# Patient Record
Sex: MALE | URBAN - METROPOLITAN AREA
[De-identification: names, ages, dates, MRNs, and addresses within clinical notes are randomized per-mention and may not be internally consistent; named-entity substitution may affect disease eponyms.]

---

## 2022-08-03 ENCOUNTER — OFFICE VISIT (OUTPATIENT)
Dept: URBAN - METROPOLITAN AREA CLINIC 60 | Facility: CLINIC | Age: 47
End: 2022-08-03
Payer: COMMERCIAL

## 2022-08-03 DIAGNOSIS — H43.393 OTHER VITREOUS OPACITIES, BILATERAL: ICD-10-CM

## 2022-08-03 DIAGNOSIS — H25.813 COMBINED FORMS OF AGE-RELATED CATARACT, BILATERAL: Primary | ICD-10-CM

## 2022-08-03 PROCEDURE — 92004 COMPRE OPH EXAM NEW PT 1/>: CPT | Performed by: OPTOMETRIST

## 2022-08-03 ASSESSMENT — VISUAL ACUITY
OD: 20/150
OS: 20/20

## 2022-08-03 ASSESSMENT — INTRAOCULAR PRESSURE
OS: 21
OD: 20

## 2022-08-03 NOTE — IMPRESSION/PLAN
Impression: Combined forms of age-related cataract, bilateral: H25.813. Plan: OD>OS. Cataract accounts for patient's complaints. Discussed all risks, benefits, procedures and recovery. Patient desires to have surgery, recommend CE w/IOL. OD first, will need OU due to anisometropia, Discussed toric/Vivity IOL/ LRI in detail with patient. Recommend surgery. Ascan needed. RTC for surgeon consult.

## 2022-08-26 ENCOUNTER — OFFICE VISIT (OUTPATIENT)
Dept: URBAN - METROPOLITAN AREA CLINIC 60 | Facility: CLINIC | Age: 47
End: 2022-08-26
Payer: COMMERCIAL

## 2022-08-26 DIAGNOSIS — H25.813 COMBINED FORMS OF AGE-RELATED CATARACT, BILATERAL: ICD-10-CM

## 2022-08-26 DIAGNOSIS — H43.393 OTHER VITREOUS OPACITIES, BILATERAL: ICD-10-CM

## 2022-08-26 PROCEDURE — 99204 OFFICE O/P NEW MOD 45 MIN: CPT | Performed by: OPHTHALMOLOGY

## 2022-08-26 ASSESSMENT — KERATOMETRY
OD: 43.58
OS: 43.58

## 2022-08-26 ASSESSMENT — INTRAOCULAR PRESSURE
OS: 22
OD: 22

## 2022-08-26 NOTE — IMPRESSION/PLAN
Impression: Combined forms of age-related cataract, bilateral: H25.813. Plan: Cataract accounts for pt complaints. Pt desires sx. Schedule CE/IOL both eyes, OD then OS. Risk/Benefits/Alternatives discussed with patient. Rec. mono-focal/Toric/Vivity. Discussed Panoptix but warned of halos around lights. Discussed possible need reading glasses with Vivity for fine, near work () Consider ORA/LRI. RL2 (ordered fasting glucose and A1C). Target distance. Combination/Generic drops. Order a-scan. Will likely need cataract surgery in the other eye due to anisometropia. Patient is a candidate for Felicia Shed but not Dexycu. Discussed R/B/A. Recommend Ofloxacin or Tobramycin QID for 1 week postop. Intra-cameral Moxifloxacin to decrease the risk of infection. May elect to use combination drops or generics per patient preference.

## 2023-02-09 ENCOUNTER — OFFICE VISIT (OUTPATIENT)
Dept: URBAN - METROPOLITAN AREA CLINIC 60 | Facility: CLINIC | Age: 48
End: 2023-02-09
Payer: COMMERCIAL

## 2023-02-09 DIAGNOSIS — H25.813 COMBINED FORMS OF AGE-RELATED CATARACT, BILATERAL: Primary | ICD-10-CM

## 2023-02-09 PROCEDURE — 92134 CPTRZ OPH DX IMG PST SGM RTA: CPT | Performed by: OPHTHALMOLOGY

## 2023-02-21 ENCOUNTER — PROCEDURE (OUTPATIENT)
Dept: URBAN - METROPOLITAN AREA SURGERY 37 | Facility: SURGERY | Age: 48
End: 2023-02-21
Payer: COMMERCIAL

## 2023-02-21 DIAGNOSIS — H25.813 COMBINED FORMS OF AGE-RELATED CATARACT, BILATERAL: Primary | ICD-10-CM

## 2023-02-21 PROCEDURE — 66984 XCAPSL CTRC RMVL W/O ECP: CPT | Performed by: OPHTHALMOLOGY

## 2023-02-21 PROCEDURE — PR4FY PR4FY: CUSTOM | Performed by: OPHTHALMOLOGY

## 2023-02-22 ENCOUNTER — POST-OPERATIVE VISIT (OUTPATIENT)
Dept: URBAN - METROPOLITAN AREA CLINIC 60 | Facility: CLINIC | Age: 48
End: 2023-02-22
Payer: COMMERCIAL

## 2023-02-22 DIAGNOSIS — Z48.810 ENCOUNTER FOR SURGICAL AFTERCARE FOLLOWING SURGERY ON A SENSE ORGAN: Primary | ICD-10-CM

## 2023-02-22 RX ORDER — BRIMONIDINE TARTRATE 2 MG/ML
0.2 % SOLUTION/ DROPS OPHTHALMIC
Qty: 0 | Refills: 0 | Status: ACTIVE
Start: 2023-02-22

## 2023-02-22 ASSESSMENT — INTRAOCULAR PRESSURE
OD: 52
OD: 30

## 2023-02-22 NOTE — IMPRESSION/PLAN
Impression: S/P Vivity Multifocal OD - 1 Day. Encounter for surgical aftercare following surgery on a sense organ  Z48.810. Plan: Continue with Pred Moxi Nepaf OD. IOP is elevated. Patient burped twice OD. After burp IOP 30 OD. Will start patient on Brimonidine BID OD, sample given. One drop of Brimonidine instilled in OD in clinic.

## 2023-02-28 ENCOUNTER — POST-OPERATIVE VISIT (OUTPATIENT)
Dept: URBAN - METROPOLITAN AREA CLINIC 60 | Facility: CLINIC | Age: 48
End: 2023-02-28
Payer: COMMERCIAL

## 2023-02-28 DIAGNOSIS — Z48.810 ENCOUNTER FOR SURGICAL AFTERCARE FOLLOWING SURGERY ON A SENSE ORGAN: Primary | ICD-10-CM

## 2023-02-28 ASSESSMENT — INTRAOCULAR PRESSURE
OD: 26
OD: 22

## 2023-02-28 ASSESSMENT — VISUAL ACUITY
OS: 20/20
OD: 20/20

## 2023-02-28 NOTE — IMPRESSION/PLAN
Impression: S/P Cataract Extraction by phacoemulsification with IOL placement; ORA OD - 7 Days. Encounter for surgical aftercare following surgery on a sense organ  Z48.810. Plan: Continue with Pred Moxi Nepaf OD. IOP is slightly elevated. Patient may be a possible steroid responder. Patient to also continue with Brimonidine BID OD.

## 2023-03-27 ENCOUNTER — POST-OPERATIVE VISIT (OUTPATIENT)
Dept: URBAN - METROPOLITAN AREA CLINIC 60 | Facility: CLINIC | Age: 48
End: 2023-03-27
Payer: COMMERCIAL

## 2023-03-27 DIAGNOSIS — H40.053 OCULAR HYPERTENSION, BILATERAL: Primary | ICD-10-CM

## 2023-03-27 DIAGNOSIS — Z48.810 ENCOUNTER FOR SURGICAL AFTERCARE FOLLOWING SURGERY ON A SENSE ORGAN: ICD-10-CM

## 2023-03-27 PROCEDURE — 92020 GONIOSCOPY: CPT | Performed by: OPHTHALMOLOGY

## 2023-03-27 PROCEDURE — 92133 CPTRZD OPH DX IMG PST SGM ON: CPT | Performed by: OPHTHALMOLOGY

## 2023-03-27 PROCEDURE — 92134 CPTRZ OPH DX IMG PST SGM RTA: CPT | Performed by: OPHTHALMOLOGY

## 2023-03-27 RX ORDER — BRIMONIDINE TARTRATE 2 MG/ML
0.2 % SOLUTION/ DROPS OPHTHALMIC
Qty: 10 | Refills: 10 | Status: ACTIVE
Start: 2023-03-27

## 2023-03-27 RX ORDER — DICLOFENAC SODIUM 1 MG/ML
0.1 % SOLUTION/ DROPS OPHTHALMIC
Qty: 5 | Refills: 1 | Status: ACTIVE
Start: 2023-03-27

## 2023-03-27 ASSESSMENT — INTRAOCULAR PRESSURE
OD: 27
OS: 25
OD: 26

## 2023-03-27 ASSESSMENT — KERATOMETRY: OD: 43.66

## 2023-03-27 ASSESSMENT — VISUAL ACUITY: OD: 20/40

## 2023-03-27 NOTE — IMPRESSION/PLAN
Impression: S/P Cataract Extraction by phacoemulsification with IOL placement; ORA OD - 34 Days. Encounter for surgical aftercare following surgery on a sense organ  Z48.810. IOP high. Unable to obtain pach. VF 30-2 and pach in the future to determine the need to continue drops. Mild thickening OD postop. Hold off on Pred due to possible steroid response. Consider CL OS.

+ pigment dispersion. Plan: Start diclofenac QID OD. Re-start brimonidine BID OU. Will need YAG OD once healed. Trial CL OS. Pt has used in the past.


2 weeks refraction. Pach.

## 2023-03-27 NOTE — IMPRESSION/PLAN
Impression: Ocular hypertension, bilateral: H40.053. Plan: IOP high. Unable to obtain pach. Re-start brimonidine BID OU. VF 30-2 and pach in the future to determine the need to continue drops.

## 2023-04-10 ENCOUNTER — POST-OPERATIVE VISIT (OUTPATIENT)
Dept: URBAN - METROPOLITAN AREA CLINIC 60 | Facility: CLINIC | Age: 48
End: 2023-04-10
Payer: COMMERCIAL

## 2023-04-10 DIAGNOSIS — Z48.810 ENCOUNTER FOR SURGICAL AFTERCARE FOLLOWING SURGERY ON A SENSE ORGAN: Primary | ICD-10-CM

## 2023-04-10 DIAGNOSIS — H40.053 OCULAR HYPERTENSION, BILATERAL: ICD-10-CM

## 2023-04-10 PROCEDURE — 76514 ECHO EXAM OF EYE THICKNESS: CPT | Performed by: OPHTHALMOLOGY

## 2023-04-10 ASSESSMENT — INTRAOCULAR PRESSURE
OD: 18
OS: 19

## 2023-04-10 ASSESSMENT — VISUAL ACUITY: OS: 20/30

## 2023-04-10 NOTE — IMPRESSION/PLAN
Impression: S/P Cataract Extraction by phacoemulsification with IOL placement; ORA OD - 48 Days. Encounter for surgical aftercare following surgery on a sense organ  Z48.810. Ocular Hypertension Other secondary cataract OD Plan: Cont. Brimonidine OU BID. will need YAG CAPS in the future. Patient elects to hold on cataract surgery OS.

## 2023-05-05 ENCOUNTER — POST-OPERATIVE VISIT (OUTPATIENT)
Dept: URBAN - METROPOLITAN AREA CLINIC 60 | Facility: CLINIC | Age: 48
End: 2023-05-05
Payer: COMMERCIAL

## 2023-05-05 DIAGNOSIS — Z96.1 PRESENCE OF INTRAOCULAR LENS: ICD-10-CM

## 2023-05-05 DIAGNOSIS — H26.491 OTHER SECONDARY CATARACT, RIGHT EYE: Primary | ICD-10-CM

## 2023-05-05 ASSESSMENT — INTRAOCULAR PRESSURE
OD: 23
OS: 23

## 2023-05-05 ASSESSMENT — VISUAL ACUITY
OD: 20/30
OS: 20/25

## 2023-05-05 NOTE — IMPRESSION/PLAN
Impression: S/P Cataract Extraction by phacoemulsification with IOL placement; ORA OD - 73 Days. Presence of intraocular lens  Z96.1. Capsular opacification OD Plan: Capsular opacification accounts for symptoms. Pt elects sx. Schedule YAG capsulotomy right eye, . Discussed R/B/A. RL2. N/C premium lens.
not examined

## 2023-05-17 ENCOUNTER — LASER (OUTPATIENT)
Dept: URBAN - METROPOLITAN AREA SURGERY 37 | Facility: SURGERY | Age: 48
End: 2023-05-17

## 2023-05-17 PROCEDURE — 66821 AFTER CATARACT LASER SURGERY: CPT | Performed by: OPHTHALMOLOGY

## 2023-06-05 ENCOUNTER — POST-OPERATIVE VISIT (OUTPATIENT)
Dept: URBAN - METROPOLITAN AREA CLINIC 60 | Facility: CLINIC | Age: 48
End: 2023-06-05
Payer: COMMERCIAL

## 2023-06-05 DIAGNOSIS — Z48.810 ENCOUNTER FOR SURGICAL AFTERCARE FOLLOWING SURGERY ON THE SENSE ORGANS: Primary | ICD-10-CM

## 2023-06-05 ASSESSMENT — VISUAL ACUITY: OD: 20/20

## 2023-06-05 ASSESSMENT — INTRAOCULAR PRESSURE
OD: 26
OD: 18